# Patient Record
Sex: FEMALE | Race: BLACK OR AFRICAN AMERICAN | NOT HISPANIC OR LATINO | URBAN - METROPOLITAN AREA
[De-identification: names, ages, dates, MRNs, and addresses within clinical notes are randomized per-mention and may not be internally consistent; named-entity substitution may affect disease eponyms.]

---

## 2021-04-22 ENCOUNTER — TELEPHONE ENCOUNTER (OUTPATIENT)
Dept: URBAN - METROPOLITAN AREA CLINIC 98 | Facility: CLINIC | Age: 46
End: 2021-04-22

## 2021-05-12 ENCOUNTER — TELEPHONE ENCOUNTER (OUTPATIENT)
Dept: URBAN - METROPOLITAN AREA CLINIC 92 | Facility: CLINIC | Age: 46
End: 2021-05-12

## 2021-05-25 ENCOUNTER — OFFICE VISIT (OUTPATIENT)
Dept: URBAN - METROPOLITAN AREA CLINIC 92 | Facility: CLINIC | Age: 46
End: 2021-05-25
Payer: COMMERCIAL

## 2021-05-25 DIAGNOSIS — K59.01 CONSTIPATION BY DELAYED COLONIC TRANSIT: ICD-10-CM

## 2021-05-25 DIAGNOSIS — R93.5 ABNORMAL CT SCAN, PELVIS: ICD-10-CM

## 2021-05-25 PROCEDURE — 99214 OFFICE O/P EST MOD 30 MIN: CPT | Performed by: INTERNAL MEDICINE

## 2021-05-25 RX ORDER — PHENTERMINE HYDROCHLORIDE 15 MG/1
CAPSULE ORAL
Qty: 0 | Refills: 0 | Status: ON HOLD | COMMUNITY
Start: 1900-01-01

## 2021-05-25 RX ORDER — LINACLOTIDE 145 UG/1
1 CAPSULE AT LEAST 30 MINUTES BEFORE THE FIRST MEAL CAPSULE, GELATIN COATED ORAL ONCE A DAY
Qty: 30 | Refills: 2 | OUTPATIENT
Start: 2021-05-25 | End: 2021-08-23

## 2021-05-25 NOTE — HPI-TODAY'S VISIT:
This is a 46 yo female here for f/u of a CT of the pelvis ordered by her urologist.  The patient had a partial hysterectomy 2019.  She developed severe left pain and was found to have a cystic ovarian mass removed by Dr. Deny Ahumada April 2020.  A few months later she developed urinary incontinence and multiple UTIs.  She was sent to Dr Alissa Jc who performed a cystoscopy and ordered a pelvic CT which demonstrated perirectal soft tissue thickening with calcifications.  MRI was recommended and she was referred to GI.  A routine screening colonoscopy February 2021 was unremarkable.  She reports deep pelvic pain just before and during a bowel movement.  The pain is worse when she is constipated.  Stool evacuation occurs 1 - 2 times a week.  Miralax when taken results in a bowel movement 24 hours later.

## 2021-07-20 ENCOUNTER — OFFICE VISIT (OUTPATIENT)
Dept: URBAN - METROPOLITAN AREA CLINIC 92 | Facility: CLINIC | Age: 46
End: 2021-07-20
Payer: COMMERCIAL

## 2021-07-20 DIAGNOSIS — R10.32 LLQ ABDOMINAL PAIN: ICD-10-CM

## 2021-07-20 DIAGNOSIS — K59.01 CONSTIPATION BY DELAYED COLONIC TRANSIT: ICD-10-CM

## 2021-07-20 PROCEDURE — 99214 OFFICE O/P EST MOD 30 MIN: CPT | Performed by: INTERNAL MEDICINE

## 2021-07-20 RX ORDER — LINACLOTIDE 145 UG/1
1 CAPSULE AT LEAST 30 MINUTES BEFORE THE FIRST MEAL CAPSULE, GELATIN COATED ORAL ONCE A DAY
Qty: 30 | Refills: 2 | Status: ACTIVE | COMMUNITY
Start: 2021-05-25 | End: 2021-08-23

## 2021-07-20 RX ORDER — PHENTERMINE HYDROCHLORIDE 15 MG/1
CAPSULE ORAL
Qty: 0 | Refills: 0 | Status: ON HOLD | COMMUNITY
Start: 1900-01-01

## 2021-07-20 RX ORDER — LINACLOTIDE 145 UG/1
1 CAPSULE AT LEAST 30 MINUTES BEFORE THE FIRST MEAL CAPSULE, GELATIN COATED ORAL ONCE A DAY
Qty: 90 | Refills: 3 | OUTPATIENT

## 2021-07-20 NOTE — HPI-TODAY'S VISIT:
(May 2021) his is a 44 yo female here for f/u of a CT of the pelvis ordered by her urologist.  The patient had a partial hysterectomy 2019.  She developed severe left pain and was found to have a cystic ovarian mass removed by Dr. Deny Ahumada April 2020.  A few months later she developed urinary incontinence and multiple UTIs.  She was sent to Dr Alissa Jc who performed a cystoscopy and ordered a pelvic CT which demonstrated perirectal soft tissue thickening with calcifications.  MRI was recommended and she was referred to GI.  A routine screening colonoscopy February 2021 was unremarkable.  She reports deep pelvic pain just before and during a bowel movement.  The pain is worse when she is constipated.  Stool evacuation occurs 1 - 2 times a week.  Miralax when taken results in a bowel movement 24 hours later.  Today: 7/20/21 This is a 44 yo female her for f/u of  constipation and an MRI 6/23/21 performed for a pelvic CT which revealed perirectal soft tissue thickening with calcifications.  The patient is doing well on Linzess 145 mcg once daily.  Stool frequency has increased from 1 - 2 times a week to daily.  She denies diarrhea. The f/u MRI did not comment on the rectal area.  She does have ovarian cysts.  She no longer has LLQ pain.

## 2022-08-02 NOTE — PHYSICAL EXAM PSYCH:
[facility] Sched[facility] called regarding pt's ultrasound order  She said it needs to be more specific  Is there something specific the doctor is looking for? Please update script  normal mood with appropriate affect

## 2022-10-26 ENCOUNTER — TELEPHONE ENCOUNTER (OUTPATIENT)
Dept: URBAN - METROPOLITAN AREA CLINIC 92 | Facility: CLINIC | Age: 47
End: 2022-10-26

## 2022-10-26 RX ORDER — LINACLOTIDE 145 UG/1
1 CAPSULE AT LEAST 30 MINUTES BEFORE THE FIRST MEAL CAPSULE, GELATIN COATED ORAL ONCE A DAY
Qty: 30 | Refills: 0

## 2022-10-28 ENCOUNTER — DASHBOARD ENCOUNTERS (OUTPATIENT)
Age: 47
End: 2022-10-28

## 2022-10-31 ENCOUNTER — CLAIMS CREATED FROM THE CLAIM WINDOW (OUTPATIENT)
Dept: URBAN - METROPOLITAN AREA TELEHEALTH 2 | Facility: TELEHEALTH | Age: 47
End: 2022-10-31
Payer: COMMERCIAL

## 2022-10-31 VITALS — BODY MASS INDEX: 36.25 KG/M2 | HEIGHT: 62 IN | WEIGHT: 197 LBS

## 2022-10-31 DIAGNOSIS — K59.01 CONSTIPATION BY DELAYED COLONIC TRANSIT: ICD-10-CM

## 2022-10-31 DIAGNOSIS — E66.9 OBESITY (BMI 30-39.9): ICD-10-CM

## 2022-10-31 PROBLEM — 162864005: Status: ACTIVE | Noted: 2022-10-31

## 2022-10-31 PROBLEM — 35298007: Status: ACTIVE | Noted: 2021-05-25

## 2022-10-31 PROCEDURE — 99213 OFFICE O/P EST LOW 20 MIN: CPT | Performed by: INTERNAL MEDICINE

## 2022-10-31 RX ORDER — LINACLOTIDE 145 UG/1
1 CAPSULE AT LEAST 30 MINUTES BEFORE THE FIRST MEAL CAPSULE, GELATIN COATED ORAL ONCE A DAY
Qty: 30 | Refills: 0 | Status: ACTIVE | COMMUNITY

## 2022-10-31 RX ORDER — LINACLOTIDE 145 UG/1
1 CAPSULE AT LEAST 30 MINUTES BEFORE THE FIRST MEAL CAPSULE, GELATIN COATED ORAL ONCE A DAY
Qty: 90 | Refills: 2

## 2022-10-31 NOTE — HPI-OTHER HISTORIES
(May 2021) his is a 46 yo female here for f/u of a CT of the pelvis ordered by her urologist.  The patient had a partial hysterectomy 2019.  She developed severe left pain and was found to have a cystic ovarian mass removed by Dr. Deny Ahumada April 2020.  A few months later she developed urinary incontinence and multiple UTIs.  She was sent to Dr Alissa Jc who performed a cystoscopy and ordered a pelvic CT which demonstrated perirectal soft tissue thickening with calcifications.  MRI was recommended and she was referred to GI.  A routine screening colonoscopy February 2021 was unremarkable.  She reports deep pelvic pain just before and during a bowel movement.  The pain is worse when she is constipated.  Stool evacuation occurs 1 - 2 times a week.  Miralax when taken results in a bowel movement 24 hours later.  (7/20/21) This is a 46 yo female her for f/u of  constipation and an MRI 6/23/21 performed for a pelvic CT which revealed perirectal soft tissue thickening with calcifications.  The patient is doing well on Linzess 145 mcg once daily.  Stool frequency has increased from 1 - 2 times a week to daily.  She denies diarrhea. The f/u MRI did not comment on the rectal area.  She does have ovarian cysts.  She no longer has LLQ pain.

## 2022-10-31 NOTE — PHYSICAL EXAM SKIN:
no rashes , no suspicious lesions , no areas of discoloration
nonverbal cues (demo/gestures)/verbal cues/supervision

## 2024-11-15 ENCOUNTER — LAB OUTSIDE AN ENCOUNTER (OUTPATIENT)
Dept: URBAN - METROPOLITAN AREA CLINIC 98 | Facility: CLINIC | Age: 49
End: 2024-11-15

## 2024-11-15 ENCOUNTER — TELEPHONE ENCOUNTER (OUTPATIENT)
Dept: URBAN - METROPOLITAN AREA CLINIC 96 | Facility: CLINIC | Age: 49
End: 2024-11-15

## 2024-11-15 ENCOUNTER — OFFICE VISIT (OUTPATIENT)
Dept: URBAN - METROPOLITAN AREA CLINIC 98 | Facility: CLINIC | Age: 49
End: 2024-11-15
Payer: COMMERCIAL

## 2024-11-15 VITALS
HEART RATE: 62 BPM | SYSTOLIC BLOOD PRESSURE: 136 MMHG | TEMPERATURE: 97.4 F | HEIGHT: 62 IN | DIASTOLIC BLOOD PRESSURE: 79 MMHG | WEIGHT: 174.8 LBS | BODY MASS INDEX: 32.17 KG/M2

## 2024-11-15 DIAGNOSIS — K58.1 IRRITABLE BOWEL SYNDROME WITH CONSTIPATION: ICD-10-CM

## 2024-11-15 DIAGNOSIS — R10.9 LEFT LATERAL ABDOMINAL PAIN: ICD-10-CM

## 2024-11-15 PROBLEM — 440630006: Status: ACTIVE | Noted: 2024-11-15

## 2024-11-15 PROCEDURE — 99214 OFFICE O/P EST MOD 30 MIN: CPT | Performed by: INTERNAL MEDICINE

## 2024-11-15 RX ORDER — LINACLOTIDE 145 UG/1
1 CAPSULE AT LEAST 30 MINUTES BEFORE THE FIRST MEAL CAPSULE, GELATIN COATED ORAL ONCE A DAY
Qty: 90 | Refills: 2 | Status: ACTIVE | COMMUNITY

## 2024-11-15 RX ORDER — ROSUVASTATIN CALCIUM 10 MG/1
TABLET, FILM COATED ORAL
Qty: 90 TABLET | Status: ACTIVE | COMMUNITY

## 2024-11-15 NOTE — HPI-TODAY'S VISIT:
Ms. Barnhart is a 48 yo F presenting for followup visit for abdominal pain.  History of ovarian cysts, constipation.  Linzess 145 mcg prescribed in the past.   Last visit on 10/31/22 with Dr. Frank  Chronic left-sided pelvic pain is still present.  Feels she is being bounced around to urologist, GI, and gynecologist.  Had a hysterectomy in 2019, complicated surgery with organs were fused/lots of adhesions, had to separate organs she says. Left ovary remained.  Then 2020 she had the left ovary removed as it "failed." Then she began having recurrent UTI, started to see urologist.  Colonoscopy in 2020 was unremarkable.   Has had chronic constipation and has taken metamucil,  She was always chronic with twice weekly her whole life and then after surgery she only has a BM once per week with a laxative.  Linzess 145 mcg is not working well. Has lower abdominal pain that can get sharp pain that improves with BMs.  No blood in stools. No unintentional weight loss.   I reviewed: 2/3/2020 colonoscopy: normal

## 2024-11-26 ENCOUNTER — WEB ENCOUNTER (OUTPATIENT)
Dept: URBAN - METROPOLITAN AREA CLINIC 98 | Facility: CLINIC | Age: 49
End: 2024-11-26

## 2024-11-26 RX ORDER — LINACLOTIDE 290 UG/1
1 CAPSULE AT LEAST 30 MINUTES BEFORE THE FIRST MEAL OF THE DAY ON AN EMPTY STOMACH CAPSULE, GELATIN COATED ORAL ONCE A DAY
Qty: 30 | Refills: 5 | OUTPATIENT
Start: 2024-11-27 | End: 2025-05-26

## 2025-01-09 ENCOUNTER — OFFICE VISIT (OUTPATIENT)
Dept: URBAN - METROPOLITAN AREA CLINIC 92 | Facility: CLINIC | Age: 50
End: 2025-01-09

## 2025-08-08 ENCOUNTER — ERX REFILL RESPONSE (OUTPATIENT)
Dept: URBAN - METROPOLITAN AREA CLINIC 98 | Facility: CLINIC | Age: 50
End: 2025-08-08

## 2025-08-08 RX ORDER — LINACLOTIDE 290 UG/1
TAKE 1 CAPSULE BY MOUTH EVERY DAY 30 MINUTES BEFORE THE FIRST MEAL OF THE DAY ON EMPTY STOMACH CAPSULE, GELATIN COATED ORAL
Qty: 30 CAPSULE | Refills: 5 | OUTPATIENT